# Patient Record
Sex: MALE | Race: ASIAN | NOT HISPANIC OR LATINO
[De-identification: names, ages, dates, MRNs, and addresses within clinical notes are randomized per-mention and may not be internally consistent; named-entity substitution may affect disease eponyms.]

---

## 2018-04-06 ENCOUNTER — APPOINTMENT (OUTPATIENT)
Dept: CARDIOLOGY | Facility: CLINIC | Age: 68
End: 2018-04-06
Payer: MEDICAID

## 2018-04-06 ENCOUNTER — NON-APPOINTMENT (OUTPATIENT)
Age: 68
End: 2018-04-06

## 2018-04-06 VITALS
SYSTOLIC BLOOD PRESSURE: 134 MMHG | HEIGHT: 62 IN | BODY MASS INDEX: 24.66 KG/M2 | WEIGHT: 134 LBS | HEART RATE: 61 BPM | RESPIRATION RATE: 18 BRPM | OXYGEN SATURATION: 99 % | DIASTOLIC BLOOD PRESSURE: 75 MMHG

## 2018-04-06 DIAGNOSIS — R94.31 ABNORMAL ELECTROCARDIOGRAM [ECG] [EKG]: ICD-10-CM

## 2018-04-06 DIAGNOSIS — Z87.891 PERSONAL HISTORY OF NICOTINE DEPENDENCE: ICD-10-CM

## 2018-04-06 DIAGNOSIS — Z01.810 ENCOUNTER FOR PREPROCEDURAL CARDIOVASCULAR EXAMINATION: ICD-10-CM

## 2018-04-06 PROBLEM — Z00.00 ENCOUNTER FOR PREVENTIVE HEALTH EXAMINATION: Status: ACTIVE | Noted: 2018-04-06

## 2018-04-06 PROCEDURE — 93320 DOPPLER ECHO COMPLETE: CPT

## 2018-04-06 PROCEDURE — 93351 STRESS TTE COMPLETE: CPT

## 2018-04-06 PROCEDURE — 99204 OFFICE O/P NEW MOD 45 MIN: CPT | Mod: 25

## 2018-04-06 PROCEDURE — 93000 ELECTROCARDIOGRAM COMPLETE: CPT | Mod: 59

## 2018-04-06 PROCEDURE — 93325 DOPPLER ECHO COLOR FLOW MAPG: CPT

## 2018-04-06 RX ORDER — HYDROCHLOROTHIAZIDE 25 MG/1
25 TABLET ORAL
Qty: 30 | Refills: 0 | Status: DISCONTINUED | COMMUNITY
Start: 2018-03-01 | End: 2018-04-06

## 2020-09-15 ENCOUNTER — APPOINTMENT (OUTPATIENT)
Dept: CARDIOLOGY | Facility: CLINIC | Age: 70
End: 2020-09-15
Payer: MEDICAID

## 2020-09-15 VITALS
BODY MASS INDEX: 27.25 KG/M2 | OXYGEN SATURATION: 98 % | TEMPERATURE: 97.7 F | DIASTOLIC BLOOD PRESSURE: 89 MMHG | HEART RATE: 89 BPM | SYSTOLIC BLOOD PRESSURE: 152 MMHG | RESPIRATION RATE: 18 BRPM | WEIGHT: 149 LBS

## 2020-09-15 DIAGNOSIS — I47.1 SUPRAVENTRICULAR TACHYCARDIA: ICD-10-CM

## 2020-09-15 PROCEDURE — 99214 OFFICE O/P EST MOD 30 MIN: CPT

## 2020-09-15 PROCEDURE — 93306 TTE W/DOPPLER COMPLETE: CPT

## 2020-09-15 RX ORDER — AMLODIPINE BESYLATE 5 MG/1
5 TABLET ORAL
Qty: 30 | Refills: 0 | Status: DISCONTINUED | COMMUNITY
Start: 2018-04-05 | End: 2020-09-15

## 2020-09-21 ENCOUNTER — NON-APPOINTMENT (OUTPATIENT)
Age: 70
End: 2020-09-21

## 2020-09-29 ENCOUNTER — APPOINTMENT (OUTPATIENT)
Dept: CARDIOLOGY | Facility: CLINIC | Age: 70
End: 2020-09-29
Payer: MEDICAID

## 2020-09-29 VITALS
HEART RATE: 63 BPM | OXYGEN SATURATION: 96 % | SYSTOLIC BLOOD PRESSURE: 132 MMHG | RESPIRATION RATE: 18 BRPM | BODY MASS INDEX: 27.25 KG/M2 | DIASTOLIC BLOOD PRESSURE: 87 MMHG | WEIGHT: 149 LBS | TEMPERATURE: 97.4 F

## 2020-09-29 PROCEDURE — 99213 OFFICE O/P EST LOW 20 MIN: CPT

## 2020-10-26 NOTE — PHYSICAL EXAM
[General Appearance - Well Developed] : well developed [Normal Appearance] : normal appearance [Well Groomed] : well groomed [General Appearance - Well Nourished] : well nourished [No Deformities] : no deformities [General Appearance - In No Acute Distress] : no acute distress [Normal Conjunctiva] : the conjunctiva exhibited no abnormalities [Eyelids - No Xanthelasma] : the eyelids demonstrated no xanthelasmas [Normal Oral Mucosa] : normal oral mucosa [No Oral Pallor] : no oral pallor [No Oral Cyanosis] : no oral cyanosis [Normal Jugular Venous A Waves Present] : normal jugular venous A waves present [Normal Jugular Venous V Waves Present] : normal jugular venous V waves present [No Jugular Venous Geller A Waves] : no jugular venous geller A waves [Respiration, Rhythm And Depth] : normal respiratory rhythm and effort [Exaggerated Use Of Accessory Muscles For Inspiration] : no accessory muscle use [Auscultation Breath Sounds / Voice Sounds] : lungs were clear to auscultation bilaterally [Heart Rate And Rhythm] : heart rate and rhythm were normal [Heart Sounds] : normal S1 and S2 [Murmurs] : no murmurs present [Abdomen Soft] : soft [Abdomen Tenderness] : non-tender [Abdomen Mass (___ Cm)] : no abdominal mass palpated [Abnormal Walk] : normal gait [Gait - Sufficient For Exercise Testing] : the gait was sufficient for exercise testing [Nail Clubbing] : no clubbing of the fingernails [Cyanosis, Localized] : no localized cyanosis [Petechial Hemorrhages (___cm)] : no petechial hemorrhages [] : no ischemic changes [Oriented To Time, Place, And Person] : oriented to person, place, and time [Affect] : the affect was normal [Mood] : the mood was normal [No Anxiety] : not feeling anxious

## 2020-10-29 ENCOUNTER — APPOINTMENT (OUTPATIENT)
Dept: CARDIOLOGY | Facility: CLINIC | Age: 70
End: 2020-10-29
Payer: MEDICAID

## 2020-10-29 VITALS
DIASTOLIC BLOOD PRESSURE: 91 MMHG | OXYGEN SATURATION: 97 % | HEART RATE: 97 BPM | RESPIRATION RATE: 18 BRPM | WEIGHT: 149 LBS | TEMPERATURE: 97.2 F | SYSTOLIC BLOOD PRESSURE: 162 MMHG | BODY MASS INDEX: 27.25 KG/M2

## 2020-10-29 PROCEDURE — 99214 OFFICE O/P EST MOD 30 MIN: CPT

## 2020-10-29 PROCEDURE — 99072 ADDL SUPL MATRL&STAF TM PHE: CPT

## 2020-10-29 NOTE — HISTORY OF PRESENT ILLNESS
[FreeTextEntry1] : 70-year-old male with HTN presents for followup.  \par \par Patient was last seen on 4/6/18 for evaluation of cardiac clearance prior to colon surgery.   Patient underwent a stress echo and completed 5 minutes of Polo protocol. There were upsloping ST depressions on ECG but there was no echocardiographic evidence of ischemia.   He was noted to have frequent APC's on stress testing.  He was on Amlodipine 5 mg and Lisinopril 20 mg for HTN.\par \par Patient reports palpitations.  He saw PCP and was noted to have irregular heartbeats.  ECG showed transient PAFlutter/PAF.  Patient denies CP. Patient denies SOB. Patient denies lightheadedness.  Patient is on Simvastatin 10 mg and Lisinopril 20 mg. I advised patient to wear a Holter monitor. I advised patient to undergo an echocardiogram.

## 2020-10-29 NOTE — PHYSICAL EXAM
[General Appearance - Well Developed] : well developed [Well Groomed] : well groomed [Normal Appearance] : normal appearance [General Appearance - Well Nourished] : well nourished [No Deformities] : no deformities [Normal Conjunctiva] : the conjunctiva exhibited no abnormalities [General Appearance - In No Acute Distress] : no acute distress [Eyelids - No Xanthelasma] : the eyelids demonstrated no xanthelasmas [Normal Oral Mucosa] : normal oral mucosa [No Oral Cyanosis] : no oral cyanosis [No Oral Pallor] : no oral pallor [Normal Jugular Venous A Waves Present] : normal jugular venous A waves present [Normal Jugular Venous V Waves Present] : normal jugular venous V waves present [No Jugular Venous Geller A Waves] : no jugular venous geller A waves [Respiration, Rhythm And Depth] : normal respiratory rhythm and effort [Exaggerated Use Of Accessory Muscles For Inspiration] : no accessory muscle use [Auscultation Breath Sounds / Voice Sounds] : lungs were clear to auscultation bilaterally [Heart Sounds] : normal S1 and S2 [Murmurs] : no murmurs present [Abdomen Soft] : soft [Abdomen Tenderness] : non-tender [Abdomen Mass (___ Cm)] : no abdominal mass palpated [Abnormal Walk] : normal gait [Gait - Sufficient For Exercise Testing] : the gait was sufficient for exercise testing [Nail Clubbing] : no clubbing of the fingernails [Cyanosis, Localized] : no localized cyanosis [Petechial Hemorrhages (___cm)] : no petechial hemorrhages [Affect] : the affect was normal [Oriented To Time, Place, And Person] : oriented to person, place, and time [] : no ischemic changes [Mood] : the mood was normal [No Anxiety] : not feeling anxious [Irregularly Irregular] : the rhythm was irregularly irregular

## 2020-10-29 NOTE — DISCUSSION/SUMMARY
[FreeTextEntry1] : 70-year-old male with HTN presents for followup.  \par \par Patient was last seen on 4/6/18 for evaluation of cardiac clearance prior to colon surgery.   Patient underwent a stress echo and completed 5 minutes of Polo protocol. There were upsloping ST depressions on ECG but there was no echocardiographic evidence of ischemia.   He was noted to have frequent APC's on stress testing.  He was on Amlodipine 5 mg and Lisinopril 20 mg for HTN.\par \par Patient reports palpitations.  He saw PCP and was noted to have irregular heartbeats.  ECG showed transient PAFlutter/PAF.  Patient denies CP. Patient denies SOB. Patient denies lightheadedness.  Patient is on Simvastatin 10 mg and Lisinopril 20 mg. I advised patient to wear a Holter monitor. I advised patient to undergo an echocardiogram. \par \par (1) Palpitations, transient PAFlutter/PAF on ECG - Patient underwent an echocardiogram and it showed normal LV function without significant valvular pathology.  Patient wore a Holter and it showed frequent runs of PAF.  I advised patient to start Atenolol 25 mg.  I advised patient to start Eliquis 5 mg BID for stroke prevention. \par \par (2) HTN - His BP was elevated today.  He will start Atenolol 25 mg.  I advised patient to continue Lisinopril 20 mg.  \par \par (3) Followup - 2 weeks.

## 2020-10-30 NOTE — PHYSICAL EXAM
[General Appearance - Well Developed] : well developed [Normal Appearance] : normal appearance [Well Groomed] : well groomed [General Appearance - Well Nourished] : well nourished [No Deformities] : no deformities [General Appearance - In No Acute Distress] : no acute distress [Normal Conjunctiva] : the conjunctiva exhibited no abnormalities [Eyelids - No Xanthelasma] : the eyelids demonstrated no xanthelasmas [Normal Oral Mucosa] : normal oral mucosa [No Oral Pallor] : no oral pallor [No Oral Cyanosis] : no oral cyanosis [Normal Jugular Venous A Waves Present] : normal jugular venous A waves present [Normal Jugular Venous V Waves Present] : normal jugular venous V waves present [No Jugular Venous Geller A Waves] : no jugular venous geller A waves [Respiration, Rhythm And Depth] : normal respiratory rhythm and effort [Exaggerated Use Of Accessory Muscles For Inspiration] : no accessory muscle use [Heart Sounds] : normal S1 and S2 [Murmurs] : no murmurs present [Abdomen Soft] : soft [Abdomen Tenderness] : non-tender [Abdomen Mass (___ Cm)] : no abdominal mass palpated [Abnormal Walk] : normal gait [Gait - Sufficient For Exercise Testing] : the gait was sufficient for exercise testing [Nail Clubbing] : no clubbing of the fingernails [Cyanosis, Localized] : no localized cyanosis [Petechial Hemorrhages (___cm)] : no petechial hemorrhages [] : no ischemic changes [Oriented To Time, Place, And Person] : oriented to person, place, and time [Affect] : the affect was normal [Mood] : the mood was normal [No Anxiety] : not feeling anxious [Bibasilar Rales/Crackles] : bibasilar rales [Arterial Pulses Normal] : the arterial pulses were normal [Edema] : no peripheral edema present [Regular-Premature Beats] : the rhythm was regular with premature beats

## 2020-10-30 NOTE — REASON FOR VISIT
[Follow-Up - Clinic] : a clinic follow-up of [Abnormal ECG] : an abnormal ECG [FreeTextEntry1] : 9/29/20 - Patient was found to have PAF on Holter. He was advised to start on Eliquis but has not yet started. He feels somewhat better on Atenolol 25 mg, but he still feels palpitations. I advised patient to add Propafenone 150 mg BID. Patient may consider ablation. FU in one month. \par \par 9/15/20 - Patient reports palpitations that are mild. Patient denies CP. Patient denies SOB. Patient denies lightheadedness. Patient is on Simvastatin 10 mg and Lisinopril 20 mg. I advised patient to wear a Holter monitor. I advised patient to undergo an echocardiogram.

## 2020-10-30 NOTE — DISCUSSION/SUMMARY
[FreeTextEntry1] : 70-year-old male with HTN presents for followup.  \par \par Patient was last seen on 9/15/20 for palpitations.  Patient underwent an echocardiogram and it showed normal LV function without significant valvular pathology.  Patient wore a Holter and it showed frequent runs of PAF.  I advised patient to start Atenolol 25 mg and Eliquis 5 mg BID.  He is on Lisinopril 20 mg for HTN.\par \par Patient was found to have PAF on Holter.  He was advised to start on Eliquis but has not yet started.  He feels somewhat better on Atenolol 25 mg, but he still feels palpitations. I advised patient to add Propafenone 150 mg BID.  Patient may consider ablation.  FU in one month. \par \par (1) PAF - Patient is feeling better but still has occasional palpitations.  I advised patient to start Eliquis 5 mg BID.  He should continue Atenolol 25 mg.  I advised patient to start Propafenone 150 mg BID.  He may consider ablation.\par \par (2) HTN - His BP was good today.  I advised patient to continue Atenolol 25 mg and Lisinopril 20 mg.  \par \par (3) Followup - 1 month.

## 2020-10-30 NOTE — HISTORY OF PRESENT ILLNESS
[FreeTextEntry1] : 70-year-old male with HTN presents for followup.  \par \par Patient was last seen on 9/15/20 for palpitations.  Patient underwent an echocardiogram and it showed normal LV function without significant valvular pathology.  Patient wore a Holter and it showed frequent runs of PAF.  I advised patient to start Atenolol 25 mg and Eliquis 5 mg BID.  He is on Lisinopril 20 mg for HTN.\par \par Patient was found to have PAF on Holter.  He was advised to start on Eliquis but has not yet started.  He feels somewhat better on Atenolol 25 mg, but he still feels palpitations. I advised patient to add Propafenone 150 mg BID.  Patient may consider ablation.  FU in one month. \par

## 2020-11-26 NOTE — DISCUSSION/SUMMARY
[FreeTextEntry1] : 70-year-old male with PAF on Holter, HTN presents for followup.  \par \par Patient was last seen on 9/29/20 for PAF.  I advised patient to start Propafenone 150 mg BID.  He is on  Atenolol 25 mg and  Eliquis 5 mg BID.  He is on Lisinopril 20 mg for HTN.\par \par Patient is still experiencing palpitations despite Propafenone 150 mg BID.  Patient is considering ablation. He reports that his sister underwent ablation last year in [facility].  Patient ran out of medications.  I advised patient to increase Atenolol to 25 mg BID.  I advised patient to continue on Eliquis 5 mg BID and Propafenone 150 mg BID.  I will contact [facility] to arrange for ablation. \par \par (1) PAF - Patient still has palpitations.   I advised patient to increase Atenolol to 25 mg BID.  I advised patient to continue on Eliquis 5 mg BID and Propafenone 150 mg BID.  I will contact [facility] to arrange for ablation. \par \par (2) HTN - His BP was elevated today.  Atenolol will be increased to 25 mg BID.  I advised patient to continue  Lisinopril 20 mg.  \par \par (3) Followup - pending ablation.\par

## 2020-11-26 NOTE — REASON FOR VISIT
[Follow-Up - Clinic] : a clinic follow-up of [Abnormal ECG] : an abnormal ECG [FreeTextEntry1] : 10/29/20 - Patient is still experiencing palpitations despite Propafenone 150 mg BID.  Patient is considering ablation. He reports that [facility]  Patient ran out of medications.  I advised patient to increase Atenolol to 25 mg BID.  I advised patient to continue on Eliquis 5 mg BID and Propafenone 150 mg BID.  I will contact [facility] to arrange for ablation. \par \par 9/29/20 - Patient was found to have PAF on Holter. He was advised to start on Eliquis but has not yet started. He feels somewhat better on Atenolol 25 mg, but he still feels palpitations. I advised patient to add Propafenone 150 mg BID. Patient may consider ablation. FU in one month. \par \par 9/15/20 - Patient reports palpitations that are mild. Patient denies CP. Patient denies SOB. Patient denies lightheadedness. Patient is on Simvastatin 10 mg and Lisinopril 20 mg. I advised patient to wear a Holter monitor. I advised patient to undergo an echocardiogram.

## 2020-11-26 NOTE — HISTORY OF PRESENT ILLNESS
[FreeTextEntry1] : 70-year-old male with PAF on Holter, HTN presents for followup.  \par \par Patient was last seen on 9/29/20 for PAF.  I advised patient to start Propafenone 150 mg BID.  He is on  Atenolol 25 mg and  Eliquis 5 mg BID.  He is on Lisinopril 20 mg for HTN.\par \par Patient is still experiencing palpitations despite Propafenone 150 mg BID.  Patient is considering ablation. He reports that his sister underwent ablation last year in [facility].  Patient ran out of medications.  I advised patient to increase Atenolol to 25 mg BID.  I advised patient to continue on Eliquis 5 mg BID and Propafenone 150 mg BID.  I will contact [facility] to arrange for ablation. \par

## 2020-11-30 ENCOUNTER — APPOINTMENT (OUTPATIENT)
Dept: CARDIOLOGY | Facility: CLINIC | Age: 70
End: 2020-11-30
Payer: MEDICAID

## 2020-11-30 VITALS
SYSTOLIC BLOOD PRESSURE: 166 MMHG | HEART RATE: 98 BPM | BODY MASS INDEX: 27.25 KG/M2 | DIASTOLIC BLOOD PRESSURE: 92 MMHG | OXYGEN SATURATION: 93 % | TEMPERATURE: 98.3 F | WEIGHT: 149 LBS | RESPIRATION RATE: 18 BRPM

## 2020-11-30 DIAGNOSIS — R00.2 PALPITATIONS: ICD-10-CM

## 2020-11-30 PROCEDURE — 99214 OFFICE O/P EST MOD 30 MIN: CPT

## 2020-11-30 PROCEDURE — 99072 ADDL SUPL MATRL&STAF TM PHE: CPT

## 2020-12-08 NOTE — DISCUSSION/SUMMARY
[FreeTextEntry1] : 70-year-old male with PAF on Holter, HTN presents for followup.  \par \par Patient was last seen on 9/29/20 for PAF.  I advised patient to start Propafenone 150 mg BID.  He is on  Atenolol 25 mg and  Eliquis 5 mg BID.  He is on Lisinopril 20 mg for HTN.\par \par Patient is still experiencing palpitations despite Propafenone 150 mg BID.  Patient is considering ablation. He reports that his sister underwent ablation last year in [facility].  Patient ran out of medications.  I advised patient to increase Atenolol to 25 mg BID.  I advised patient to continue on Eliquis 5 mg BID and Propafenone 150 mg BID.  I will contact [facility] to arrange for ablation. \par \par (1) PAF - Patient still has palpitations.   I advised patient to increase Atenolol to 25 mg BID.  I advised patient to continue on Eliquis 5 mg BID and Propafenone 150 mg BID.  I will contact [facility] to arrange for ablation. \par \par (2) HTN - His BP was elevated today.  Atenolol will be increased to 25 mg BID.  I advised patient to continue  Lisinopril 20 mg.  \par \par (3) Followup - pending ablation.\par

## 2020-12-08 NOTE — REASON FOR VISIT
[Follow-Up - Clinic] : a clinic follow-up of [Abnormal ECG] : an abnormal ECG [FreeTextEntry1] : 11/30/20- Patient is scheduled for ablation on 12/15. He reports SOB with fast HR. His Bp is elevated. I advised patient to add HCTZ and come back in one month. \par \par \par 10/29/20 - Patient is still experiencing palpitations despite Propafenone 150 mg BID.  Patient is considering ablation. He reports that his sister underwent ablation last year in [facility].  Patient ran out of medications.  I advised patient to increase Atenolol to 25 mg BID.  I advised patient to continue on Eliquis 5 mg BID and Propafenone 150 mg BID.  I will contact [facility] to arrange for ablation. \par \par 9/29/20 - Patient was found to have PAF on Holter. He was advised to start on Eliquis but has not yet started. He feels somewhat better on Atenolol 25 mg, but he still feels palpitations. I advised patient to add Propafenone 150 mg BID. Patient may consider ablation. FU in one month. \par \par 9/15/20 - Patient reports palpitations that are mild. Patient denies CP. Patient denies SOB. Patient denies lightheadedness. Patient is on Simvastatin 10 mg and Lisinopril 20 mg. I advised patient to wear a Holter monitor. I advised patient to undergo an echocardiogram.

## 2020-12-08 NOTE — HISTORY OF PRESENT ILLNESS
[FreeTextEntry1] : 70-year-old male with PAF on Holter, HTN presents for followup.  \par \par Patient was last seen on 10/29/20 for PAF. I advised patient to increase Atenolol to 25 mg BID. I advised patient to continue on Eliquis 5 mg BID and Propafenone 150 mg BID.   He is on Lisinopril 20 mg for HTN.  I advised patient to undergo ablation. \par \par ------------------------------------------------------------------------------------------------------------- \par previous visit summary:\par \par (1) PAF - Patient still has palpitations.   I advised patient to increase Atenolol to 25 mg BID.  I advised patient to continue on Eliquis 5 mg BID and Propafenone 150 mg BID.  I will contact [facility] to arrange for ablation. \par \par (2) HTN - His BP was elevated today.  Atenolol will be increased to 25 mg BID.  I advised patient to continue  Lisinopril 20 mg.  \par \par (3) Followup - pending ablation.\par \par Old note -

## 2020-12-28 ENCOUNTER — APPOINTMENT (OUTPATIENT)
Dept: CARDIOLOGY | Facility: CLINIC | Age: 70
End: 2020-12-28
Payer: MEDICAID

## 2020-12-28 VITALS
OXYGEN SATURATION: 96 % | TEMPERATURE: 98 F | WEIGHT: 146 LBS | SYSTOLIC BLOOD PRESSURE: 143 MMHG | RESPIRATION RATE: 17 BRPM | HEART RATE: 69 BPM | DIASTOLIC BLOOD PRESSURE: 79 MMHG | BODY MASS INDEX: 26.7 KG/M2

## 2020-12-28 DIAGNOSIS — R10.13 EPIGASTRIC PAIN: ICD-10-CM

## 2020-12-28 PROCEDURE — 99072 ADDL SUPL MATRL&STAF TM PHE: CPT

## 2020-12-28 PROCEDURE — 99213 OFFICE O/P EST LOW 20 MIN: CPT

## 2020-12-28 RX ORDER — SIMETHICONE 125 MG/1
125 TABLET, CHEWABLE ORAL
Qty: 90 | Refills: 1 | Status: ACTIVE | COMMUNITY
Start: 2020-12-28 | End: 1900-01-01

## 2020-12-28 NOTE — REASON FOR VISIT
[Follow-Up - Clinic] : a clinic follow-up of [Abnormal ECG] : an abnormal ECG [FreeTextEntry1] : 70-year-old male with PAF on Holter, HTN presents for followup.  \par \par Patient was last seen on 11/30/20 for PAF.  His BP was elevated. I advised patient to add HCTZ and come back in one month.  He is on Atenolol to 25 mg BID for rate control.   He is on Eliquis 5 mg BID for stroke prevention.  He is on Propafenone 150 mg BID for AFIB suppression.  He is on Lisinopril 20 mg for HTN.  Patient was scheduled for ablation.  Pre-ablation CTA showed LM disease.  Patient underwent a cardiac cath and it showed mild LM disease only.

## 2020-12-28 NOTE — HISTORY OF PRESENT ILLNESS
[FreeTextEntry1] : 12/28/20 - Patient is scheduled for ablation next week.  Patient still reports palpitations.  Her BP is better today.  I advised patient to continue current medications.  FU 1 month.\par \par 11/30/20 - Patient is scheduled for ablation on 12/15. He reports SOB with fast HR. His BP is elevated. I advised patient to add HCTZ and come back in one month. \par \par 10/29/20 - Patient is still experiencing palpitations despite Propafenone 150 mg BID.  Patient is considering ablation. He reports that his sister underwent ablation last year in [facility].  Patient ran out of medications.  I advised patient to increase Atenolol to 25 mg BID.  I advised patient to continue on Eliquis 5 mg BID and Propafenone 150 mg BID.  I will contact [facility] to arrange for ablation. \par \par 9/29/20 - Patient was found to have PAF on Holter. He was advised to start on Eliquis but has not yet started. He feels somewhat better on Atenolol 25 mg, but he still feels palpitations. I advised patient to add Propafenone 150 mg BID. Patient may consider ablation. FU in one month. \par \par 9/15/20 - Patient reports palpitations that are mild. Patient denies CP. Patient denies SOB. Patient denies lightheadedness. Patient is on Simvastatin 10 mg and Lisinopril 20 mg. I advised patient to wear a Holter monitor. I advised patient to undergo an echocardiogram.

## 2020-12-28 NOTE — PHYSICAL EXAM
[General Appearance - Well Developed] : well developed [Normal Appearance] : normal appearance [Well Groomed] : well groomed [General Appearance - Well Nourished] : well nourished [No Deformities] : no deformities [General Appearance - In No Acute Distress] : no acute distress [Normal Conjunctiva] : the conjunctiva exhibited no abnormalities [Eyelids - No Xanthelasma] : the eyelids demonstrated no xanthelasmas [Normal Oral Mucosa] : normal oral mucosa [No Oral Pallor] : no oral pallor [No Oral Cyanosis] : no oral cyanosis [Normal Jugular Venous A Waves Present] : normal jugular venous A waves present [Normal Jugular Venous V Waves Present] : normal jugular venous V waves present [No Jugular Venous Geller A Waves] : no jugular venous geller A waves [Respiration, Rhythm And Depth] : normal respiratory rhythm and effort [Exaggerated Use Of Accessory Muscles For Inspiration] : no accessory muscle use [Auscultation Breath Sounds / Voice Sounds] : lungs were clear to auscultation bilaterally [Heart Rate And Rhythm] : heart rate and rhythm were normal [Heart Sounds] : normal S1 and S2 [Murmurs] : no murmurs present [Abdomen Soft] : soft [Abdomen Tenderness] : non-tender [Abdomen Mass (___ Cm)] : no abdominal mass palpated [Abnormal Walk] : normal gait [Gait - Sufficient For Exercise Testing] : the gait was sufficient for exercise testing [Nail Clubbing] : no clubbing of the fingernails [Cyanosis, Localized] : no localized cyanosis [Petechial Hemorrhages (___cm)] : no petechial hemorrhages [] : no ischemic changes [Oriented To Time, Place, And Person] : oriented to person, place, and time [Affect] : the affect was normal [Mood] : the mood was normal [No Anxiety] : not feeling anxious [Arterial Pulses Normal] : the arterial pulses were normal [Edema] : no peripheral edema present

## 2020-12-28 NOTE — DISCUSSION/SUMMARY
[FreeTextEntry1] : 70-year-old male with PAF on Holter, HTN presents for followup.  \par \par Patient was last seen on 9/29/20 for PAF.  I advised patient to start Propafenone 150 mg BID.  He is on  Atenolol 25 mg and  Eliquis 5 mg BID.  He is on Lisinopril 20 mg for HTN.\par \par Patient is still experiencing palpitations despite Propafenone 150 mg BID.  Patient is considering ablation. He reports that his sister underwent ablation last year in [facility].  Patient ran out of medications.  I advised patient to increase Atenolol to 25 mg BID.  I advised patient to continue on Eliquis 5 mg BID and Propafenone 150 mg BID.  I will contact [facility] to arrange for ablation. \par \par (1) PAF - Patient still has palpitations.   I advised patient to increase Atenolol to 25 mg BID.  I advised patient to continue on Eliquis 5 mg BID and Propafenone 150 mg BID.  I will contact [facility] to arrange for ablation. \par \par (2) HTN - His BP was elevated today.  Atenolol will be increased to 25 mg BID.  I advised patient to continue  Lisinopril 20 mg.  \par \par (3) Followup - pending ablation.\par

## 2021-01-23 NOTE — PHYSICAL EXAM
[Normal Appearance] : normal appearance [General Appearance - Well Developed] : well developed [Well Groomed] : well groomed [General Appearance - Well Nourished] : well nourished [No Deformities] : no deformities [General Appearance - In No Acute Distress] : no acute distress [Normal Conjunctiva] : the conjunctiva exhibited no abnormalities [Eyelids - No Xanthelasma] : the eyelids demonstrated no xanthelasmas [Normal Oral Mucosa] : normal oral mucosa [No Oral Pallor] : no oral pallor [No Oral Cyanosis] : no oral cyanosis [Normal Jugular Venous A Waves Present] : normal jugular venous A waves present [Normal Jugular Venous V Waves Present] : normal jugular venous V waves present [No Jugular Venous Geller A Waves] : no jugular venous geller A waves [Respiration, Rhythm And Depth] : normal respiratory rhythm and effort [Exaggerated Use Of Accessory Muscles For Inspiration] : no accessory muscle use [Auscultation Breath Sounds / Voice Sounds] : lungs were clear to auscultation bilaterally [Heart Rate And Rhythm] : heart rate and rhythm were normal [Murmurs] : no murmurs present [Heart Sounds] : normal S1 and S2 [Arterial Pulses Normal] : the arterial pulses were normal [Edema] : no peripheral edema present [Abdomen Soft] : soft [Abdomen Tenderness] : non-tender [Abdomen Mass (___ Cm)] : no abdominal mass palpated [Gait - Sufficient For Exercise Testing] : the gait was sufficient for exercise testing [Abnormal Walk] : normal gait [Nail Clubbing] : no clubbing of the fingernails [Cyanosis, Localized] : no localized cyanosis [Petechial Hemorrhages (___cm)] : no petechial hemorrhages [] : no ischemic changes [Oriented To Time, Place, And Person] : oriented to person, place, and time [Mood] : the mood was normal [Affect] : the affect was normal [No Anxiety] : not feeling anxious

## 2021-01-25 ENCOUNTER — APPOINTMENT (OUTPATIENT)
Dept: CARDIOLOGY | Facility: CLINIC | Age: 71
End: 2021-01-25
Payer: MEDICAID

## 2021-01-25 VITALS
RESPIRATION RATE: 17 BRPM | SYSTOLIC BLOOD PRESSURE: 121 MMHG | OXYGEN SATURATION: 95 % | TEMPERATURE: 97.9 F | WEIGHT: 149 LBS | BODY MASS INDEX: 27.25 KG/M2 | DIASTOLIC BLOOD PRESSURE: 77 MMHG | HEART RATE: 67 BPM

## 2021-01-25 PROCEDURE — 99213 OFFICE O/P EST LOW 20 MIN: CPT

## 2021-01-25 PROCEDURE — 99072 ADDL SUPL MATRL&STAF TM PHE: CPT

## 2021-01-25 NOTE — DISCUSSION/SUMMARY
[FreeTextEntry1] : 70-year-old male with PAF on Holter, HTN presents for followup.  \par \par Patient was last seen on 9/29/20 for PAF.  I advised patient to start Propafenone 150 mg BID.  He is on  Atenolol 25 mg and  Eliquis 5 mg BID.  He is on Lisinopril 20 mg for HTN.\par \par Patient is still experiencing palpitations despite Propafenone 150 mg BID.  Patient is considering ablation. He reports that his sister underwent ablation last year in [facility].  Patient ran out of medications.  I advised patient to increase Atenolol to 25 mg BID.  I advised patient to continue on Eliquis 5 mg BID and Propafenone 150 mg BID.  I will contact [facility] to arrange for ablation. \par \par (1) PAF - Patient still has palpitations.   I advised patient to increase Atenolol to 25 mg BID.  I advised patient to continue on Eliquis 5 mg BID and Propafenone 150 mg BID.  I will contact [facility] to arrange for ablation. \par \par (2) HTN - His BP was elevated today.  Atenolol will be increased to 25 mg BID.  I advised patient to continue  Lisinopril 20 mg.  \par \par (3) Followup - pending ablation.\par

## 2021-01-25 NOTE — HISTORY OF PRESENT ILLNESS
[FreeTextEntry1] : 70-year-old male with PAF s/p ablation at [facility] in 1/2021, mild LM disease on cardiac cath, HTN presents for followup.  \par \par Patient was last seen on 12/28/20 for PAF.   Patient underwent successful AFIB ablation at [facility] last week.   He is on Atenolol to 25 mg BID for rate control.   He is on Eliquis 5 mg BID for stroke prevention.  He is on Propafenone 150 mg BID for AFIB suppression.  He is on Lisinopril 20 mg and HCTZ 12.5 mg for HTN.  \par \par Last echo was on 9/15/20  and it showed normal LV function without significant valvular pathology. \par \par Last cardiac cath was on 12/15/20 and it showed mild LM disease.

## 2021-01-25 NOTE — REASON FOR VISIT
[Follow-Up - Clinic] : a clinic follow-up of [Abnormal ECG] : an abnormal ECG [FreeTextEntry1] : 1/25/21 - Patient has been stable s/p ablation.  He is off Propafenone.  I advised patient to continue Eliquis.  He will followup with EP today.  FU 3 months. \par \par 12/28/20 - Patient is scheduled for ablation next week.  Patient still reports palpitations.  Her BP is better today.  I advised patient to continue current medications.  FU 1 month.\par \par 11/30/20 - Patient is scheduled for ablation on 12/15. He reports SOB with fast HR. His BP is elevated. I advised patient to add HCTZ and come back in one month. \par \par 10/29/20 - Patient is still experiencing palpitations despite Propafenone 150 mg BID.  Patient is considering ablation. He reports that his sister underwent ablation last year in [facility].  Patient ran out of medications.  I advised patient to increase Atenolol to 25 mg BID.  I advised patient to continue on Eliquis 5 mg BID and Propafenone 150 mg BID.  I will contact [facility] to arrange for ablation. \par \par 9/29/20 - Patient was found to have PAF on Holter. He was advised to start on Eliquis but has not yet started. He feels somewhat better on Atenolol 25 mg, but he still feels palpitations. I advised patient to add Propafenone 150 mg BID. Patient may consider ablation. FU in one month. \par \par 9/15/20 - Patient reports palpitations that are mild. Patient denies CP. Patient denies SOB. Patient denies lightheadedness. Patient is on Simvastatin 10 mg and Lisinopril 20 mg. I advised patient to wear a Holter monitor. I advised patient to undergo an echocardiogram. \par \par \par

## 2021-04-24 NOTE — PHYSICAL EXAM
[General Appearance - Well Developed] : well developed [Normal Appearance] : normal appearance [Well Groomed] : well groomed [General Appearance - Well Nourished] : well nourished [No Deformities] : no deformities [General Appearance - In No Acute Distress] : no acute distress [Normal Conjunctiva] : the conjunctiva exhibited no abnormalities [Eyelids - No Xanthelasma] : the eyelids demonstrated no xanthelasmas [Normal Oral Mucosa] : normal oral mucosa [No Oral Pallor] : no oral pallor [No Oral Cyanosis] : no oral cyanosis [Normal Jugular Venous A Waves Present] : normal jugular venous A waves present [Normal Jugular Venous V Waves Present] : normal jugular venous V waves present [No Jugular Venous Geller A Waves] : no jugular venous geller A waves [Exaggerated Use Of Accessory Muscles For Inspiration] : no accessory muscle use [Respiration, Rhythm And Depth] : normal respiratory rhythm and effort [Auscultation Breath Sounds / Voice Sounds] : lungs were clear to auscultation bilaterally [Heart Rate And Rhythm] : heart rate and rhythm were normal [Heart Sounds] : normal S1 and S2 [Murmurs] : no murmurs present [Arterial Pulses Normal] : the arterial pulses were normal [Edema] : no peripheral edema present [Abdomen Soft] : soft [Abdomen Tenderness] : non-tender [Abdomen Mass (___ Cm)] : no abdominal mass palpated [Abnormal Walk] : normal gait [Nail Clubbing] : no clubbing of the fingernails [Gait - Sufficient For Exercise Testing] : the gait was sufficient for exercise testing [Cyanosis, Localized] : no localized cyanosis [Petechial Hemorrhages (___cm)] : no petechial hemorrhages [] : no ischemic changes [Affect] : the affect was normal [Oriented To Time, Place, And Person] : oriented to person, place, and time [Mood] : the mood was normal [No Anxiety] : not feeling anxious

## 2021-04-26 ENCOUNTER — APPOINTMENT (OUTPATIENT)
Dept: CARDIOLOGY | Facility: CLINIC | Age: 71
End: 2021-04-26
Payer: MEDICAID

## 2021-04-26 VITALS
DIASTOLIC BLOOD PRESSURE: 76 MMHG | OXYGEN SATURATION: 96 % | BODY MASS INDEX: 27.62 KG/M2 | TEMPERATURE: 98.1 F | WEIGHT: 151 LBS | HEART RATE: 76 BPM | RESPIRATION RATE: 18 BRPM | SYSTOLIC BLOOD PRESSURE: 125 MMHG

## 2021-04-26 DIAGNOSIS — M25.569 PAIN IN UNSPECIFIED KNEE: ICD-10-CM

## 2021-04-26 DIAGNOSIS — E78.5 HYPERLIPIDEMIA, UNSPECIFIED: ICD-10-CM

## 2021-04-26 PROCEDURE — 99072 ADDL SUPL MATRL&STAF TM PHE: CPT

## 2021-04-26 PROCEDURE — 99214 OFFICE O/P EST MOD 30 MIN: CPT

## 2021-04-26 RX ORDER — PROPAFENONE HYDROCHLORIDE 150 MG/1
150 TABLET, FILM COATED ORAL
Qty: 60 | Refills: 5 | Status: DISCONTINUED | COMMUNITY
Start: 2020-09-29 | End: 2021-04-26

## 2021-04-26 RX ORDER — DICLOFENAC SODIUM 1% 10 MG/G
1 GEL TOPICAL
Qty: 100 | Refills: 5 | Status: ACTIVE | COMMUNITY
Start: 2020-12-10 | End: 1900-01-01

## 2021-07-22 NOTE — PHYSICAL EXAM
[General Appearance - Well Developed] : well developed [Normal Appearance] : normal appearance [Well Groomed] : well groomed [General Appearance - Well Nourished] : well nourished [No Deformities] : no deformities [General Appearance - In No Acute Distress] : no acute distress [Normal Conjunctiva] : the conjunctiva exhibited no abnormalities [Eyelids - No Xanthelasma] : the eyelids demonstrated no xanthelasmas [Normal Oral Mucosa] : normal oral mucosa [No Oral Pallor] : no oral pallor [No Oral Cyanosis] : no oral cyanosis [Normal Jugular Venous A Waves Present] : normal jugular venous A waves present [Normal Jugular Venous V Waves Present] : normal jugular venous V waves present [No Jugular Venous Geller A Waves] : no jugular venous geller A waves [Respiration, Rhythm And Depth] : normal respiratory rhythm and effort [Exaggerated Use Of Accessory Muscles For Inspiration] : no accessory muscle use [Auscultation Breath Sounds / Voice Sounds] : lungs were clear to auscultation bilaterally [Heart Rate And Rhythm] : heart rate and rhythm were normal [Heart Sounds] : normal S1 and S2 [Murmurs] : no murmurs present [Arterial Pulses Normal] : the arterial pulses were normal [Edema] : no peripheral edema present [Abdomen Soft] : soft [Abdomen Tenderness] : non-tender [Abdomen Mass (___ Cm)] : no abdominal mass palpated [Abnormal Walk] : normal gait [Gait - Sufficient For Exercise Testing] : the gait was sufficient for exercise testing [Nail Clubbing] : no clubbing of the fingernails [Cyanosis, Localized] : no localized cyanosis [Petechial Hemorrhages (___cm)] : no petechial hemorrhages [] : no ischemic changes [Oriented To Time, Place, And Person] : oriented to person, place, and time [Affect] : the affect was normal [Mood] : the mood was normal [No Anxiety] : not feeling anxious

## 2021-07-22 NOTE — HISTORY OF PRESENT ILLNESS
[FreeTextEntry1] : 70-year-old male with PAF s/p ablation at [facility] in 1/2021, mild LM disease on cardiac cath, HTN presents for followup.  \par \par Patient was last seen on 1/25/21 for PAF s/p AFIB ablation at [facility].  \par \par He is on Atenolol to 25 mg BID for rate control.   He is on Eliquis 5 mg BID for stroke prevention.     He is on Lisinopril 20 mg and HCTZ 12.5 mg for HTN.  \par \par Last echo was on 9/15/20  and it showed normal LV function without significant valvular pathology. \par \par Last cardiac cath was on 12/15/20 and it showed mild LM disease.

## 2021-07-22 NOTE — REASON FOR VISIT
[Follow-Up - Clinic] : a clinic follow-up of [Abnormal ECG] : an abnormal ECG [FreeTextEntry1] : 4/26/21 - Patient has been stable.  Patient denies CP or SOB.  Patient reports palpitations when anxious.  Patient denies lightheadedness.  Patient denies bleeding.  He is still taking Propafenone.  I advised patient to stop.  FU 3 months.\par \par 1/25/21 - Patient has been stable s/p ablation.  He is off Propafenone.  I advised patient to continue Eliquis.  He will followup with EP today.  FU 3 months. \par \par 12/28/20 - Patient is scheduled for ablation next week.  Patient still reports palpitations.  Her BP is better today.  I advised patient to continue current medications.  FU 1 month.\par \par 11/30/20 - Patient is scheduled for ablation on 12/15. He reports SOB with fast HR. His BP is elevated. I advised patient to add HCTZ and come back in one month. \par \par 10/29/20 - Patient is still experiencing palpitations despite Propafenone 150 mg BID.  Patient is considering ablation. He reports that his sister underwent ablation last year in [facility].  Patient ran out of medications.  I advised patient to increase Atenolol to 25 mg BID.  I advised patient to continue on Eliquis 5 mg BID and Propafenone 150 mg BID.  I will contact [facility] to arrange for ablation. \par \par 9/29/20 - Patient was found to have PAF on Holter. He was advised to start on Eliquis but has not yet started. He feels somewhat better on Atenolol 25 mg, but he still feels palpitations. I advised patient to add Propafenone 150 mg BID. Patient may consider ablation. FU in one month. \par \par 9/15/20 - Patient reports palpitations that are mild. Patient denies CP. Patient denies SOB. Patient denies lightheadedness. Patient is on Simvastatin 10 mg and Lisinopril 20 mg. I advised patient to wear a Holter monitor. I advised patient to undergo an echocardiogram. \par \par \par

## 2021-07-23 ENCOUNTER — APPOINTMENT (OUTPATIENT)
Dept: CARDIOLOGY | Facility: CLINIC | Age: 71
End: 2021-07-23
Payer: MEDICAID

## 2021-07-23 ENCOUNTER — NON-APPOINTMENT (OUTPATIENT)
Age: 71
End: 2021-07-23

## 2021-07-23 VITALS
BODY MASS INDEX: 27.62 KG/M2 | HEART RATE: 62 BPM | DIASTOLIC BLOOD PRESSURE: 91 MMHG | SYSTOLIC BLOOD PRESSURE: 162 MMHG | RESPIRATION RATE: 18 BRPM | WEIGHT: 151 LBS | TEMPERATURE: 98 F | OXYGEN SATURATION: 96 %

## 2021-07-23 PROCEDURE — 99213 OFFICE O/P EST LOW 20 MIN: CPT

## 2021-07-23 PROCEDURE — 93000 ELECTROCARDIOGRAM COMPLETE: CPT

## 2022-02-09 ENCOUNTER — NON-APPOINTMENT (OUTPATIENT)
Age: 72
End: 2022-02-09

## 2022-02-09 ENCOUNTER — APPOINTMENT (OUTPATIENT)
Dept: CARDIOLOGY | Facility: CLINIC | Age: 72
End: 2022-02-09
Payer: MEDICAID

## 2022-02-09 VITALS
TEMPERATURE: 98.2 F | BODY MASS INDEX: 26.7 KG/M2 | RESPIRATION RATE: 18 BRPM | HEART RATE: 91 BPM | OXYGEN SATURATION: 95 % | SYSTOLIC BLOOD PRESSURE: 128 MMHG | DIASTOLIC BLOOD PRESSURE: 81 MMHG | WEIGHT: 146 LBS

## 2022-02-09 DIAGNOSIS — Z98.890 OTHER SPECIFIED POSTPROCEDURAL STATES: ICD-10-CM

## 2022-02-09 PROCEDURE — 93015 CV STRESS TEST SUPVJ I&R: CPT

## 2022-02-09 PROCEDURE — 99214 OFFICE O/P EST MOD 30 MIN: CPT | Mod: 25

## 2022-02-09 PROCEDURE — 93306 TTE W/DOPPLER COMPLETE: CPT

## 2022-02-09 PROCEDURE — 93000 ELECTROCARDIOGRAM COMPLETE: CPT | Mod: 59

## 2022-02-09 RX ORDER — ATENOLOL 25 MG/1
25 TABLET ORAL DAILY
Qty: 30 | Refills: 5 | Status: ACTIVE | COMMUNITY
Start: 2020-09-15 | End: 1900-01-01

## 2022-02-09 RX ORDER — HYDROCHLOROTHIAZIDE 12.5 MG/1
12.5 TABLET ORAL
Qty: 30 | Refills: 5 | Status: ACTIVE | COMMUNITY
Start: 2020-11-30 | End: 1900-01-01

## 2022-02-09 RX ORDER — SIMVASTATIN 10 MG/1
10 TABLET, FILM COATED ORAL
Qty: 30 | Refills: 5 | Status: ACTIVE | COMMUNITY
Start: 2021-03-04 | End: 1900-01-01

## 2022-02-09 RX ORDER — LISINOPRIL 20 MG/1
20 TABLET ORAL
Refills: 0 | Status: ACTIVE | COMMUNITY
Start: 2018-04-05

## 2022-02-09 RX ORDER — APIXABAN 5 MG/1
5 TABLET, FILM COATED ORAL
Qty: 60 | Refills: 5 | Status: ACTIVE | COMMUNITY
Start: 2020-09-26 | End: 1900-01-01

## 2022-02-09 NOTE — REASON FOR VISIT
[Symptom and Test Evaluation] : symptom and test evaluation [FreeTextEntry1] : 7/23/21 - Patient has been stable.  Patient denies CP, SOB, or palpitations.  His BP was elevated.  I advised patient to resume HCTZ 12.5 mg.  FU 6 months. \par \par 4/26/21 - Patient has been stable.  Patient denies CP or SOB.  Patient reports palpitations when anxious.  Patient denies lightheadedness.  Patient denies bleeding.  He is still taking Propafenone.  I advised patient to stop.  FU 3 months.\par \par 1/25/21 - Patient has been stable s/p ablation.  He is off Propafenone.  I advised patient to continue Eliquis.  He will followup with EP today.  FU 3 months. \par \par 12/28/20 - Patient is scheduled for ablation next week.  Patient still reports palpitations.  Her BP is better today.  I advised patient to continue current medications.  FU 1 month.\par \par 11/30/20 - Patient is scheduled for ablation on 12/15. He reports SOB with fast HR. His BP is elevated. I advised patient to add HCTZ and come back in one month. \par \par 10/29/20 - Patient is still experiencing palpitations despite Propafenone 150 mg BID.  Patient is considering ablation. He reports that his sister underwent ablation last year in [facility].  Patient ran out of medications.  I advised patient to increase Atenolol to 25 mg BID.  I advised patient to continue on Eliquis 5 mg BID and Propafenone 150 mg BID.  I will contact [facility] to arrange for ablation. \par \par 9/29/20 - Patient was found to have PAF on Holter. He was advised to start on Eliquis but has not yet started. He feels somewhat better on Atenolol 25 mg, but he still feels palpitations. I advised patient to add Propafenone 150 mg BID. Patient may consider ablation. FU in one month. \par \par 9/15/20 - Patient reports palpitations that are mild. Patient denies CP. Patient denies SOB. Patient denies lightheadedness. Patient is on Simvastatin 10 mg and Lisinopril 20 mg. I advised patient to wear a Holter monitor. I advised patient to undergo an echocardiogram. \par \par \par

## 2022-02-09 NOTE — PHYSICAL EXAM
[General Appearance - Well Developed] : well developed [Normal Appearance] : normal appearance [Well Groomed] : well groomed [General Appearance - Well Nourished] : well nourished [No Deformities] : no deformities [General Appearance - In No Acute Distress] : no acute distress [Normal Conjunctiva] : the conjunctiva exhibited no abnormalities [Eyelids - No Xanthelasma] : the eyelids demonstrated no xanthelasmas [Normal Oral Mucosa] : normal oral mucosa [No Oral Pallor] : no oral pallor [No Oral Cyanosis] : no oral cyanosis [Normal Jugular Venous A Waves Present] : normal jugular venous A waves present [Normal Jugular Venous V Waves Present] : normal jugular venous V waves present [No Jugular Venous Geller A Waves] : no jugular venous geller A waves [Respiration, Rhythm And Depth] : normal respiratory rhythm and effort [Exaggerated Use Of Accessory Muscles For Inspiration] : no accessory muscle use [Heart Rate And Rhythm] : heart rate and rhythm were normal [Heart Sounds] : normal S1 and S2 [Murmurs] : no murmurs present [Arterial Pulses Normal] : the arterial pulses were normal [Edema] : no peripheral edema present [Abdomen Soft] : soft [Abdomen Tenderness] : non-tender [Abdomen Mass (___ Cm)] : no abdominal mass palpated [Abnormal Walk] : normal gait [Gait - Sufficient For Exercise Testing] : the gait was sufficient for exercise testing [Nail Clubbing] : no clubbing of the fingernails [Cyanosis, Localized] : no localized cyanosis [Petechial Hemorrhages (___cm)] : no petechial hemorrhages [] : no ischemic changes [Oriented To Time, Place, And Person] : oriented to person, place, and time [Affect] : the affect was normal [Mood] : the mood was normal [No Anxiety] : not feeling anxious [Bibasilar Rales/Crackles] : bibasilar rales

## 2022-02-09 NOTE — HISTORY OF PRESENT ILLNESS
[FreeTextEntry1] : 70-year-old male with PAF s/p ablation at [facility] in 1/2021, mild LM disease on cardiac cath, HTN presents for followup.  \par \par Patient was last seen on 4/26/21.  I advised patient to stop Propafenone.\par \par He is on Eliquis 5 mg BID for stroke prevention.   He is on Atenolol to 25 mg BID for PAF suppression.  He is on Lisinopril 20 mg and HCTZ 12.5 mg for HTN.  \par \par Last echo was on 9/15/20  and it showed normal LV function without significant valvular pathology. \par \par Last cardiac cath was on 12/15/20 and it showed mild LM disease.

## 2023-02-17 ENCOUNTER — APPOINTMENT (OUTPATIENT)
Dept: CARDIOLOGY | Facility: CLINIC | Age: 73
End: 2023-02-17
Payer: MEDICARE

## 2023-02-17 ENCOUNTER — NON-APPOINTMENT (OUTPATIENT)
Age: 73
End: 2023-02-17

## 2023-02-17 VITALS
BODY MASS INDEX: 27.07 KG/M2 | HEART RATE: 61 BPM | SYSTOLIC BLOOD PRESSURE: 184 MMHG | RESPIRATION RATE: 18 BRPM | WEIGHT: 148 LBS | DIASTOLIC BLOOD PRESSURE: 103 MMHG | OXYGEN SATURATION: 96 % | TEMPERATURE: 98 F

## 2023-02-17 VITALS — SYSTOLIC BLOOD PRESSURE: 172 MMHG | DIASTOLIC BLOOD PRESSURE: 90 MMHG

## 2023-02-17 DIAGNOSIS — I48.0 PAROXYSMAL ATRIAL FIBRILLATION: ICD-10-CM

## 2023-02-17 DIAGNOSIS — I10 ESSENTIAL (PRIMARY) HYPERTENSION: ICD-10-CM

## 2023-02-17 DIAGNOSIS — I25.10 ATHEROSCLEROTIC HEART DISEASE OF NATIVE CORONARY ARTERY W/OUT ANGINA PECTORIS: ICD-10-CM

## 2023-02-17 DIAGNOSIS — Z98.890 OTHER SPECIFIED POSTPROCEDURAL STATES: ICD-10-CM

## 2023-02-17 DIAGNOSIS — Z86.79 OTHER SPECIFIED POSTPROCEDURAL STATES: ICD-10-CM

## 2023-02-17 PROCEDURE — 99214 OFFICE O/P EST MOD 30 MIN: CPT | Mod: 25

## 2023-02-17 PROCEDURE — 93000 ELECTROCARDIOGRAM COMPLETE: CPT | Mod: 59

## 2023-02-17 RX ORDER — AMLODIPINE BESYLATE 5 MG/1
5 TABLET ORAL DAILY
Qty: 30 | Refills: 5 | Status: ACTIVE | COMMUNITY
Start: 2023-02-17 | End: 1900-01-01

## 2023-02-17 NOTE — PHYSICAL EXAM
[General Appearance - Well Developed] : well developed [Normal Appearance] : normal appearance [Well Groomed] : well groomed [General Appearance - Well Nourished] : well nourished [No Deformities] : no deformities [General Appearance - In No Acute Distress] : no acute distress [Normal Conjunctiva] : the conjunctiva exhibited no abnormalities [Eyelids - No Xanthelasma] : the eyelids demonstrated no xanthelasmas [Normal Oral Mucosa] : normal oral mucosa [No Oral Pallor] : no oral pallor [No Oral Cyanosis] : no oral cyanosis [Normal Jugular Venous A Waves Present] : normal jugular venous A waves present [Normal Jugular Venous V Waves Present] : normal jugular venous V waves present [No Jugular Venous Geller A Waves] : no jugular venous geller A waves [Respiration, Rhythm And Depth] : normal respiratory rhythm and effort [Exaggerated Use Of Accessory Muscles For Inspiration] : no accessory muscle use [Bibasilar Rales/Crackles] : bibasilar rales [Heart Rate And Rhythm] : heart rate and rhythm were normal [Heart Sounds] : normal S1 and S2 [Murmurs] : no murmurs present [Arterial Pulses Normal] : the arterial pulses were normal [Edema] : no peripheral edema present [Abdomen Soft] : soft [Abdomen Tenderness] : non-tender [Abdomen Mass (___ Cm)] : no abdominal mass palpated [Abnormal Walk] : normal gait [Gait - Sufficient For Exercise Testing] : the gait was sufficient for exercise testing [Nail Clubbing] : no clubbing of the fingernails [Cyanosis, Localized] : no localized cyanosis [Petechial Hemorrhages (___cm)] : no petechial hemorrhages [] : no ischemic changes [Oriented To Time, Place, And Person] : oriented to person, place, and time [Affect] : the affect was normal [Mood] : the mood was normal [No Anxiety] : not feeling anxious

## 2023-02-17 NOTE — HISTORY OF PRESENT ILLNESS
[FreeTextEntry1] : 2/9/22 - Patient has been stable.  Patient denies CP, SOB, or palpitations.  He needs refills.  Patient's BP was good today.  He has an abnormal ECG, showing  ST depressions.  I advised patient to undergo an echocardiogram and a treadmill stress test. \par \par 7/23/21 - Patient has been stable.  Patient denies CP, SOB, or palpitations.  His BP was elevated.  I advised patient to resume HCTZ 12.5 mg.  FU 6 months. \par \par 4/26/21 - Patient has been stable.  Patient denies CP or SOB.  Patient reports palpitations when anxious.  Patient denies lightheadedness.  Patient denies bleeding.  He is still taking Propafenone.  I advised patient to stop.  FU 3 months.\par \par 1/25/21 - Patient has been stable s/p ablation.  He is off Propafenone.  I advised patient to continue Eliquis.  He will followup with EP today.  FU 3 months. \par \par 12/28/20 - Patient is scheduled for ablation next week.  Patient still reports palpitations.  Her BP is better today.  I advised patient to continue current medications.  FU 1 month.\par \par 11/30/20 - Patient is scheduled for ablation on 12/15. He reports SOB with fast HR. His BP is elevated. I advised patient to add HCTZ and come back in one month. \par \par 10/29/20 - Patient is still experiencing palpitations despite Propafenone 150 mg BID.  Patient is considering ablation. He reports that his sister underwent ablation last year in [facility].  Patient ran out of medications.  I advised patient to increase Atenolol to 25 mg BID.  I advised patient to continue on Eliquis 5 mg BID and Propafenone 150 mg BID.  I will contact [facility] to arrange for ablation. \par \par 9/29/20 - Patient was found to have PAF on Holter. He was advised to start on Eliquis but has not yet started. He feels somewhat better on Atenolol 25 mg, but he still feels palpitations. I advised patient to add Propafenone 150 mg BID. Patient may consider ablation. FU in one month. \par \par 9/15/20 - Patient reports palpitations that are mild. Patient denies CP. Patient denies SOB. Patient denies lightheadedness. Patient is on Simvastatin 10 mg and Lisinopril 20 mg. I advised patient to wear a Holter monitor. I advised patient to undergo an echocardiogram. \par \par \par

## 2023-02-17 NOTE — REASON FOR VISIT
[FreeTextEntry1] : 70-year-old male with PAF s/p ablation at [facility] in 1/2021, mild LM disease on cardiac cath, HTN presents for followup.  \par \par Patient was last seen on 7/23/21.  I advised patient to resume HCTZ 12.5 mg.  \par \par He is on Eliquis 5 mg BID for stroke prevention.   He is on Atenolol to 25 mg BID for PAF suppression.  He is on Lisinopril 20 mg and HCTZ 12.5 mg for HTN.  \par \par Last echo was on 9/15/20  and it showed normal LV function without significant valvular pathology. \par \par Last cardiac cath was on 12/15/20 and it showed mild LM disease.

## 2023-03-02 ENCOUNTER — APPOINTMENT (OUTPATIENT)
Dept: CARDIOLOGY | Facility: CLINIC | Age: 73
End: 2023-03-02
Payer: MEDICARE

## 2023-03-02 VITALS
TEMPERATURE: 97.9 F | RESPIRATION RATE: 18 BRPM | HEART RATE: 70 BPM | SYSTOLIC BLOOD PRESSURE: 171 MMHG | DIASTOLIC BLOOD PRESSURE: 93 MMHG | OXYGEN SATURATION: 97 %

## 2023-03-02 PROCEDURE — ZZZZZ: CPT

## 2023-03-02 PROCEDURE — 93015 CV STRESS TEST SUPVJ I&R: CPT

## 2023-03-02 PROCEDURE — 93306 TTE W/DOPPLER COMPLETE: CPT

## 2023-03-21 ENCOUNTER — APPOINTMENT (OUTPATIENT)
Dept: CARDIOLOGY | Facility: CLINIC | Age: 73
End: 2023-03-21
Payer: MEDICARE

## 2023-03-21 DIAGNOSIS — R06.02 SHORTNESS OF BREATH: ICD-10-CM

## 2023-03-21 PROCEDURE — A9500: CPT

## 2023-03-21 PROCEDURE — 78452 HT MUSCLE IMAGE SPECT MULT: CPT

## 2023-03-21 PROCEDURE — 93015 CV STRESS TEST SUPVJ I&R: CPT

## 2023-03-22 PROBLEM — R06.02 SOB (SHORTNESS OF BREATH): Status: ACTIVE | Noted: 2023-02-17

## 2023-03-22 NOTE — HISTORY OF PRESENT ILLNESS
[FreeTextEntry1] : 2/17/23 - Pt reports RODRIGUEZ for 1-2 months. Pt denies CP and palpitations. His BP at home 140s/90s. He is on Lisinopril 30 mg, Atenolol 25 mg BID, Eliquis 5 mg BID, and Zocor 10 mg. Patient has rales at base. I advised patient to undergo an echocardiogram and a treadmill stress test. \par \par 2/9/22 - Patient has been stable.  Patient denies CP, SOB, or palpitations.  He needs refills.  Patient's BP was good today.  He has an abnormal ECG, showing  ST depressions.  I advised patient to undergo an echocardiogram and a treadmill stress test.  Patient underwent an echocardiogram and it showed normal LV function, mild ascending aorta (4.1 cm), without significant valvular pathology. Patient underwent a treadmill stress test and completed 5 minutes of Polo protocol.  There were non-diagnostic ST depressions on ECG but no symptoms.  Following treadmill stress, there was no echocardiographic evidence of ischemia. \par \par 7/23/21 - Patient has been stable.  Patient denies CP, SOB, or palpitations.  His BP was elevated.  I advised patient to resume HCTZ 12.5 mg.  FU 6 months. \par \par 4/26/21 - Patient has been stable.  Patient denies CP or SOB.  Patient reports palpitations when anxious.  Patient denies lightheadedness.  Patient denies bleeding.  He is still taking Propafenone.  I advised patient to stop.  FU 3 months.\par \par 1/25/21 - Patient has been stable s/p ablation.  He is off Propafenone.  I advised patient to continue Eliquis.  He will followup with EP today.  FU 3 months. \par \par 12/28/20 - Patient is scheduled for ablation next week.  Patient still reports palpitations.  Her BP is better today.  I advised patient to continue current medications.  FU 1 month.\par \par 11/30/20 - Patient is scheduled for ablation on 12/15. He reports SOB with fast HR. His BP is elevated. I advised patient to add HCTZ and come back in one month. \par \par 10/29/20 - Patient is still experiencing palpitations despite Propafenone 150 mg BID.  Patient is considering ablation. He reports that his sister underwent ablation last year in [facility].  Patient ran out of medications.  I advised patient to increase Atenolol to 25 mg BID.  I advised patient to continue on Eliquis 5 mg BID and Propafenone 150 mg BID.  I will contact [facility] to arrange for ablation. \par \par 9/29/20 - Patient was found to have PAF on Holter. He was advised to start on Eliquis but has not yet started. He feels somewhat better on Atenolol 25 mg, but he still feels palpitations. I advised patient to add Propafenone 150 mg BID. Patient may consider ablation. FU in one month. \par \par 9/15/20 - Patient reports palpitations that are mild. Patient denies CP. Patient denies SOB. Patient denies lightheadedness. Patient is on Simvastatin 10 mg and Lisinopril 20 mg. I advised patient to wear a Holter monitor. I advised patient to undergo an echocardiogram. \par \par \par

## 2023-03-22 NOTE — REASON FOR VISIT
[FreeTextEntry1] : 70-year-old male with PAF s/p ablation at [facility] in 1/2021, mild LM disease on cardiac cath 12/2020, HTN presents for followup.  \par \par Patient was last seen on 2/9/22 for followup.  Patient was stable.   He had an abnormal ECG, showing  ST depressions.  I advised patient to undergo an echocardiogram and a treadmill stress test.  Patient underwent an echocardiogram and it showed normal LV function, mild ascending aorta (4.1 cm), without significant valvular pathology. Patient underwent a treadmill stress test and completed 5 minutes of Polo protocol.  There were non-diagnostic ST depressions on ECG but no symptoms.  Following treadmill stress, there was no echocardiographic evidence of ischemia. \par \par He is on Eliquis 5 mg BID for stroke prevention.   He is on Atenolol to 25 mg BID for PAF suppression.  He is on Lisinopril 20 mg and HCTZ 12.5 mg for HTN.  \par \par Last echo was on 9/15/20  and it showed normal LV function without significant valvular pathology. \par \par Last cardiac cath was on 12/15/20 and it showed mild LM disease.

## 2024-10-04 ENCOUNTER — APPOINTMENT (OUTPATIENT)
Dept: CARDIOLOGY | Facility: CLINIC | Age: 74
End: 2024-10-04

## 2024-10-04 ENCOUNTER — NON-APPOINTMENT (OUTPATIENT)
Age: 74
End: 2024-10-04

## 2024-10-04 ENCOUNTER — APPOINTMENT (OUTPATIENT)
Dept: CARDIOLOGY | Facility: CLINIC | Age: 74
End: 2024-10-04
Payer: MEDICARE

## 2024-10-04 VITALS
RESPIRATION RATE: 16 BRPM | BODY MASS INDEX: 27.07 KG/M2 | DIASTOLIC BLOOD PRESSURE: 87 MMHG | WEIGHT: 148 LBS | HEART RATE: 65 BPM | SYSTOLIC BLOOD PRESSURE: 161 MMHG | OXYGEN SATURATION: 97 %

## 2024-10-04 DIAGNOSIS — I71.9 AORTIC ANEURYSM OF UNSPECIFIED SITE, W/OUT RUPTURE: ICD-10-CM

## 2024-10-04 DIAGNOSIS — Z98.890 OTHER SPECIFIED POSTPROCEDURAL STATES: ICD-10-CM

## 2024-10-04 DIAGNOSIS — I10 ESSENTIAL (PRIMARY) HYPERTENSION: ICD-10-CM

## 2024-10-04 DIAGNOSIS — I48.0 PAROXYSMAL ATRIAL FIBRILLATION: ICD-10-CM

## 2024-10-04 DIAGNOSIS — Z86.79 OTHER SPECIFIED POSTPROCEDURAL STATES: ICD-10-CM

## 2024-10-04 DIAGNOSIS — R07.89 OTHER CHEST PAIN: ICD-10-CM

## 2024-10-04 DIAGNOSIS — I25.10 ATHEROSCLEROTIC HEART DISEASE OF NATIVE CORONARY ARTERY W/OUT ANGINA PECTORIS: ICD-10-CM

## 2024-10-04 PROCEDURE — 93306 TTE W/DOPPLER COMPLETE: CPT

## 2024-10-04 PROCEDURE — 99214 OFFICE O/P EST MOD 30 MIN: CPT

## 2024-10-04 RX ORDER — OMEPRAZOLE 20 MG/1
20 CAPSULE, DELAYED RELEASE ORAL
Qty: 30 | Refills: 1 | Status: ACTIVE | COMMUNITY
Start: 2024-10-04 | End: 1900-01-01

## 2024-10-07 NOTE — HISTORY OF PRESENT ILLNESS
[FreeTextEntry1] : 10/4/24 - Please refer to NP note below. Pt was noted to have some cardiac problem by his vascular surgeon and presents for evaluation. Pt reports frequent lower CP for a few months, usually after meal, lasting for a few hours. Pt also reports frequent SOB for a few months, usually when walking or became full after meal. Pt denies palpitations or lightheadedness. Pt denies h/o syncope. His home BP is around 130/80. Today's /87 P 65. Repeated /91 P 72.  Pt is on Eliquis 5 mg BID for stroke prevention. He is on Lisinopril 30 mg and Amlodipine 10mg for HTN. He is on Simvastatin 10mg and Lovaza. He is also on Metformin 500mg for DM.   Patient has rales at base. Patient reports that he is still smoking. Patient has lower CP I advised patient to start on Omeprazole 20 mg, I advised patient to get CTA at [facility]     2/17/23 - Pt reports RODRIGUEZ for 1-2 months. Pt denies CP and palpitations. His BP at home 140s/90s. He is on Lisinopril 30 mg, Atenolol 25 mg BID, Eliquis 5 mg BID, and Zocor 10 mg. Patient has rales at base. I advised patient to undergo an echocardiogram and a treadmill stress test.  Patient underwent an echocardiogram and it showed normal LV function, mild dilatation of the ascending aorta (4.0 cm), without significant valvular pathology.  Patient underwent a treadmill stress test and completed 3 minutes of Polo protocol.  There were non-diagnostic ST depressions on ECG but no symptoms.  Following treadmill stress, there was no echocardiographic evidence of ischemia.  However, patient was unable to reach target HR.  Patient underwent a pharmacologic nuclear stress test and it showed normal myocardial perfusion.   2/9/22 - Patient has been stable.  Patient denies CP, SOB, or palpitations.  He needs refills.  Patient's BP was good today.  He has an abnormal ECG, showing  ST depressions.  I advised patient to undergo an echocardiogram and a treadmill stress test.  Patient underwent an echocardiogram and it showed normal LV function, mild ascending aorta (4.1 cm), without significant valvular pathology. Patient underwent a treadmill stress test and completed 5 minutes of Polo protocol.  There were non-diagnostic ST depressions on ECG but no symptoms.  Following treadmill stress, there was no echocardiographic evidence of ischemia.   7/23/21 - Patient has been stable.  Patient denies CP, SOB, or palpitations.  His BP was elevated.  I advised patient to resume HCTZ 12.5 mg.  FU 6 months.   4/26/21 - Patient has been stable.  Patient denies CP or SOB.  Patient reports palpitations when anxious.  Patient denies lightheadedness.  Patient denies bleeding.  He is still taking Propafenone.  I advised patient to stop.  FU 3 months.  1/25/21 - Patient has been stable s/p ablation.  He is off Propafenone.  I advised patient to continue Eliquis.  He will followup with EP today.  FU 3 months.   12/28/20 - Patient is scheduled for ablation next week.  Patient still reports palpitations.  Her BP is better today.  I advised patient to continue current medications.  FU 1 month.  11/30/20 - Patient is scheduled for ablation on 12/15. He reports SOB with fast HR. His BP is elevated. I advised patient to add HCTZ and come back in one month.   10/29/20 - Patient is still experiencing palpitations despite Propafenone 150 mg BID.  Patient is considering ablation. He reports that his sister underwent ablation last year in [facility].  Patient ran out of medications.  I advised patient to increase Atenolol to 25 mg BID.  I advised patient to continue on Eliquis 5 mg BID and Propafenone 150 mg BID.  I will contact [facility] to arrange for ablation.   9/29/20 - Patient was found to have PAF on Holter. He was advised to start on Eliquis but has not yet started. He feels somewhat better on Atenolol 25 mg, but he still feels palpitations. I advised patient to add Propafenone 150 mg BID. Patient may consider ablation. FU in one month.   9/15/20 - Patient reports palpitations that are mild. Patient denies CP. Patient denies SOB. Patient denies lightheadedness. Patient is on Simvastatin 10 mg and Lisinopril 20 mg. I advised patient to wear a Holter monitor. I advised patient to undergo an echocardiogram.

## 2024-10-07 NOTE — REASON FOR VISIT
[FreeTextEntry1] : 74 year-old male with PAF s/p ablation at [facility] in 1/2021, mild LM disease on cardiac cath 12/2020, HTN presents for followup.    Patient was last seen on 2/17/23 - Pt reports ORDRIGUEZ for 1-2 months. Pt denies CP and palpitations. His BP at home 140s/90s. He is on Lisinopril 30 mg, Atenolol 25 mg BID, Eliquis 5 mg BID, and Zocor 10 mg. Patient has rales at base. I advised patient to undergo an echocardiogram and a treadmill stress test.  Patient underwent an echocardiogram and it showed normal LV function, mild dilatation of the ascending aorta (4.0 cm), without significant valvular pathology.  Patient underwent a treadmill stress test and completed 3 minutes of Polo protocol.  There were non-diagnostic ST depressions on ECG but no symptoms.  Following treadmill stress, there was no echocardiographic evidence of ischemia.  However, patient was unable to reach target HR.  Patient underwent a pharmacologic nuclear stress test and it showed normal myocardial perfusion.   He is on Eliquis 5 mg BID for stroke prevention.   He is on Atenolol to 25 mg BID for PAF suppression.  He is on Lisinopril 20 mg and HCTZ 12.5 mg for HTN.    Patient underwent an echocardiogram 2/9/22 and it showed normal LV function, mild ascending aorta (4.1 cm), without significant valvular pathology.   Patient underwent a treadmill stress test 2/9/22 and completed 5 minutes of Polo protocol.  There were non-diagnostic ST depressions on ECG but no symptoms.  Following treadmill stress, there was no echocardiographic evidence of ischemia.   Last echo was on 9/15/20  and it showed normal LV function without significant valvular pathology.   Last cardiac cath was on 12/15/20 and it showed mild LM disease.

## 2024-10-07 NOTE — HISTORY OF PRESENT ILLNESS
[FreeTextEntry1] : 10/4/24 - Please refer to NP note below. Pt was noted to have some cardiac problem by his vascular surgeon and presents for evaluation. Pt reports frequent lower CP for a few months, usually after meal, lasting for a few hours. Pt also reports frequent SOB for a few months, usually when walking or became full after meal. Pt denies palpitations or lightheadedness. Pt denies h/o syncope. His home BP is around 130/80. Today's /87 P 65. Repeated /91 P 72.  Pt is on Eliquis 5 mg BID for stroke prevention. He is on Lisinopril 30 mg and Amlodipine 10mg for HTN. He is on Simvastatin 10mg and Lovaza. He is also on Metformin 500mg for DM.   Patient has rales at base. Patient reports that he is still smoking. Patient has lower CP I advised patient to start on Omeprazole 20 mg, I advised patient to get CTA at [facility]     2/17/23 - Pt reports RODRIGUEZ for 1-2 months. Pt denies CP and palpitations. His BP at home 140s/90s. He is on Lisinopril 30 mg, Atenolol 25 mg BID, Eliquis 5 mg BID, and Zocor 10 mg. Patient has rales at base. I advised patient to undergo an echocardiogram and a treadmill stress test.  Patient underwent an echocardiogram and it showed normal LV function, mild dilatation of the ascending aorta (4.0 cm), without significant valvular pathology.  Patient underwent a treadmill stress test and completed 3 minutes of Polo protocol.  There were non-diagnostic ST depressions on ECG but no symptoms.  Following treadmill stress, there was no echocardiographic evidence of ischemia.  However, patient was unable to reach target HR.  Patient underwent a pharmacologic nuclear stress test and it showed normal myocardial perfusion.   2/9/22 - Patient has been stable.  Patient denies CP, SOB, or palpitations.  He needs refills.  Patient's BP was good today.  He has an abnormal ECG, showing  ST depressions.  I advised patient to undergo an echocardiogram and a treadmill stress test.  Patient underwent an echocardiogram and it showed normal LV function, mild ascending aorta (4.1 cm), without significant valvular pathology. Patient underwent a treadmill stress test and completed 5 minutes of Polo protocol.  There were non-diagnostic ST depressions on ECG but no symptoms.  Following treadmill stress, there was no echocardiographic evidence of ischemia.   7/23/21 - Patient has been stable.  Patient denies CP, SOB, or palpitations.  His BP was elevated.  I advised patient to resume HCTZ 12.5 mg.  FU 6 months.   4/26/21 - Patient has been stable.  Patient denies CP or SOB.  Patient reports palpitations when anxious.  Patient denies lightheadedness.  Patient denies bleeding.  He is still taking Propafenone.  I advised patient to stop.  FU 3 months.  1/25/21 - Patient has been stable s/p ablation.  He is off Propafenone.  I advised patient to continue Eliquis.  He will followup with EP today.  FU 3 months.   12/28/20 - Patient is scheduled for ablation next week.  Patient still reports palpitations.  Her BP is better today.  I advised patient to continue current medications.  FU 1 month.  11/30/20 - Patient is scheduled for ablation on 12/15. He reports SOB with fast HR. His BP is elevated. I advised patient to add HCTZ and come back in one month.   10/29/20 - Patient is still experiencing palpitations despite Propafenone 150 mg BID.  Patient is considering ablation. He reports that his sister underwent ablation last year in [facility].  Patient ran out of medications.  I advised patient to increase Atenolol to 25 mg BID.  I advised patient to continue on Eliquis 5 mg BID and Propafenone 150 mg BID.  I will contact [facility] to arrange for ablation.   9/29/20 - Patient was found to have PAF on Holter. He was advised to start on Eliquis but has not yet started. He feels somewhat better on Atenolol 25 mg, but he still feels palpitations. I advised patient to add Propafenone 150 mg BID. Patient may consider ablation. FU in one month.   9/15/20 - Patient reports palpitations that are mild. Patient denies CP. Patient denies SOB. Patient denies lightheadedness. Patient is on Simvastatin 10 mg and Lisinopril 20 mg. I advised patient to wear a Holter monitor. I advised patient to undergo an echocardiogram.

## 2024-10-07 NOTE — REASON FOR VISIT
[FreeTextEntry1] : 74 year-old male with PAF s/p ablation at [facility] in 1/2021, mild LM disease on cardiac cath 12/2020, HTN presents for followup.    Patient was last seen on 2/17/23 - Pt reports RODRIGUEZ for 1-2 months. Pt denies CP and palpitations. His BP at home 140s/90s. He is on Lisinopril 30 mg, Atenolol 25 mg BID, Eliquis 5 mg BID, and Zocor 10 mg. Patient has rales at base. I advised patient to undergo an echocardiogram and a treadmill stress test.  Patient underwent an echocardiogram and it showed normal LV function, mild dilatation of the ascending aorta (4.0 cm), without significant valvular pathology.  Patient underwent a treadmill stress test and completed 3 minutes of Polo protocol.  There were non-diagnostic ST depressions on ECG but no symptoms.  Following treadmill stress, there was no echocardiographic evidence of ischemia.  However, patient was unable to reach target HR.  Patient underwent a pharmacologic nuclear stress test and it showed normal myocardial perfusion.   He is on Eliquis 5 mg BID for stroke prevention.   He is on Atenolol to 25 mg BID for PAF suppression.  He is on Lisinopril 20 mg and HCTZ 12.5 mg for HTN.    Patient underwent an echocardiogram 2/9/22 and it showed normal LV function, mild ascending aorta (4.1 cm), without significant valvular pathology.   Patient underwent a treadmill stress test 2/9/22 and completed 5 minutes of Polo protocol.  There were non-diagnostic ST depressions on ECG but no symptoms.  Following treadmill stress, there was no echocardiographic evidence of ischemia.   Last echo was on 9/15/20  and it showed normal LV function without significant valvular pathology.   Last cardiac cath was on 12/15/20 and it showed mild LM disease.

## 2024-10-14 LAB
ANION GAP SERPL CALC-SCNC: 16 MMOL/L
BUN SERPL-MCNC: 17 MG/DL
CALCIUM SERPL-MCNC: 9.5 MG/DL
CHLORIDE SERPL-SCNC: 102 MMOL/L
CO2 SERPL-SCNC: 27 MMOL/L
CREAT SERPL-MCNC: 1.47 MG/DL
EGFR: 49 ML/MIN/1.73M2
GLUCOSE SERPL-MCNC: 173 MG/DL
POTASSIUM SERPL-SCNC: 4.3 MMOL/L
SODIUM SERPL-SCNC: 145 MMOL/L

## 2025-04-10 ENCOUNTER — APPOINTMENT (OUTPATIENT)
Dept: CARDIOLOGY | Facility: CLINIC | Age: 75
End: 2025-04-10

## 2025-04-10 VITALS
HEART RATE: 73 BPM | WEIGHT: 150 LBS | DIASTOLIC BLOOD PRESSURE: 83 MMHG | SYSTOLIC BLOOD PRESSURE: 136 MMHG | OXYGEN SATURATION: 96 % | RESPIRATION RATE: 16 BRPM | BODY MASS INDEX: 27.44 KG/M2

## 2025-04-10 PROCEDURE — 99214 OFFICE O/P EST MOD 30 MIN: CPT

## 2025-07-09 ENCOUNTER — APPOINTMENT (OUTPATIENT)
Dept: CARDIOLOGY | Facility: CLINIC | Age: 75
End: 2025-07-09

## 2025-07-09 VITALS
HEART RATE: 65 BPM | DIASTOLIC BLOOD PRESSURE: 74 MMHG | BODY MASS INDEX: 27.07 KG/M2 | RESPIRATION RATE: 16 BRPM | WEIGHT: 148 LBS | SYSTOLIC BLOOD PRESSURE: 144 MMHG | OXYGEN SATURATION: 97 %

## 2025-07-09 PROCEDURE — 99214 OFFICE O/P EST MOD 30 MIN: CPT

## 2025-07-10 LAB
ALBUMIN SERPL ELPH-MCNC: 4.4 G/DL
ALP BLD-CCNC: 55 U/L
ALT SERPL-CCNC: 37 U/L
ANION GAP SERPL CALC-SCNC: 15 MMOL/L
AST SERPL-CCNC: 27 U/L
BILIRUB SERPL-MCNC: 1.1 MG/DL
BUN SERPL-MCNC: 24 MG/DL
CALCIUM SERPL-MCNC: 9.9 MG/DL
CHLORIDE SERPL-SCNC: 101 MMOL/L
CO2 SERPL-SCNC: 23 MMOL/L
CREAT SERPL-MCNC: 1.39 MG/DL
EGFRCR SERPLBLD CKD-EPI 2021: 53 ML/MIN/1.73M2
GLUCOSE SERPL-MCNC: 141 MG/DL
NT-PROBNP SERPL-MCNC: 98 PG/ML
POTASSIUM SERPL-SCNC: 4.2 MMOL/L
PROT SERPL-MCNC: 6.8 G/DL
SODIUM SERPL-SCNC: 138 MMOL/L

## 2025-07-11 RX ORDER — BUDESONIDE AND FORMOTEROL FUMARATE DIHYDRATE 80; 4.5 UG/1; UG/1
80-4.5 AEROSOL RESPIRATORY (INHALATION) TWICE DAILY
Qty: 1 | Refills: 5 | Status: ACTIVE | COMMUNITY
Start: 2025-07-10 | End: 1900-01-01